# Patient Record
Sex: FEMALE | Race: WHITE | NOT HISPANIC OR LATINO | URBAN - METROPOLITAN AREA
[De-identification: names, ages, dates, MRNs, and addresses within clinical notes are randomized per-mention and may not be internally consistent; named-entity substitution may affect disease eponyms.]

---

## 2023-06-14 ENCOUNTER — TELEPHONE (OUTPATIENT)
Dept: TRANSPLANT | Facility: CLINIC | Age: 53
End: 2023-06-14
Payer: COMMERCIAL

## 2023-06-14 NOTE — TELEPHONE ENCOUNTER
Patient was referred to us to see Dr Yan from Dr. Singh in [place] because of her post TOTAL PANCREATECTOMY-ISLET AUTO TRANSPLANT hypoglycemia she is having and she is not on insulin. Sent message to provider for scheduling options.

## 2023-07-23 ENCOUNTER — HEALTH MAINTENANCE LETTER (OUTPATIENT)
Age: 53
End: 2023-07-23

## 2023-08-31 ENCOUNTER — VIRTUAL VISIT (OUTPATIENT)
Dept: TRANSPLANT | Facility: CLINIC | Age: 53
End: 2023-08-31
Attending: PEDIATRICS
Payer: COMMERCIAL

## 2023-08-31 VITALS
HEART RATE: 64 BPM | WEIGHT: 151.1 LBS | DIASTOLIC BLOOD PRESSURE: 73 MMHG | SYSTOLIC BLOOD PRESSURE: 112 MMHG | RESPIRATION RATE: 16 BRPM | OXYGEN SATURATION: 100 %

## 2023-08-31 DIAGNOSIS — E16.1 REACTIVE HYPOGLYCEMIA: Primary | ICD-10-CM

## 2023-08-31 DIAGNOSIS — K91.2 HYPOGLYCEMIA AFTER GI (GASTROINTESTINAL) SURGERY: ICD-10-CM

## 2023-08-31 PROCEDURE — 99213 OFFICE O/P EST LOW 20 MIN: CPT | Performed by: PEDIATRICS

## 2023-08-31 PROCEDURE — 99205 OFFICE O/P NEW HI 60 MIN: CPT | Performed by: PEDIATRICS

## 2023-08-31 RX ORDER — ACARBOSE 50 MG/1
TABLET ORAL
Qty: 180 TABLET | Refills: 11 | Status: SHIPPED | OUTPATIENT
Start: 2023-08-31

## 2023-08-31 RX ORDER — ESTRADIOL 0.05 MG/D
1 PATCH, EXTENDED RELEASE TRANSDERMAL
COMMUNITY
Start: 2023-08-26

## 2023-08-31 RX ORDER — ONDANSETRON 4 MG/1
4 TABLET, ORALLY DISINTEGRATING ORAL EVERY 6 HOURS PRN
COMMUNITY
Start: 2022-05-20

## 2023-08-31 RX ORDER — ALPRAZOLAM 0.25 MG
0.25 TABLET ORAL
COMMUNITY
Start: 2023-08-02

## 2023-08-31 NOTE — LETTER
8/31/2023         RE: Mallory Hagen  [address] [facility] [address]        Dear Colleague,    Thank you for referring your patient, Mallory Hagen, to the [facility]. Please see a copy of my visit note below.    Pediatric Endocrinology/ Islet Autotransplant  Chronic Pancreatitis/TPIAT Consult    Patient Active Problem List   Diagnosis     Reactive hypoglycemia     Hypoglycemia after GI (gastrointestinal) surgery       Assessment/Plan:  1.  Hypoglycemia, reactive hypoglycemia pattern after GI surgery  2.  S/p TPIAT in 2014    Mallory is a 52 year old with pancreatitis, who underwent a successful TPIAT procedure at the [facility] in 2014.  She is referred by her team at the [facility] to see me because she has started to have recurrent reactive hypoglycemia that is particularly impairing her quality of life.  She is exhausted which she feels is related to the glycemic excursions and struggles because while she can maintain a more stable glycemic pattern with diet, the diet that works for hypoglycemia is not at all tolerated by her GI system, and her GI diet is the opposite of what we recommend for hypoglycemia (GI diet is high in simple carbs, low protein/fat content).    In this context, we discussed trying acarbose at  mg per meal as the next step, which might help reduce the glycemic excursions she has with the GI diet.  We also agree that she should remain on her Dexcom G7 for monitoring, and we sent a Rx for a glucagon emergency pen (Gvoke) to have on hand in case of severe hypoglycemia, which fortunately she has not had any such episodes to date.    I am happy to continue to see her once a year and remain in virtual contact in between visits.    Patient Instructions   1)  Let's try the Acarbose 50 mg tablets, start with taking 1 with your main meals and see how that goes.  Watch for bloating, diarrhea, greasy stool as a sign of GI side effects.  If  you are tolerating it but still having lows, you can go up to 100 mg.    2)  Gvoke 1 mg emergency pen.  Have this on hand just in case of a low that you pass out or have a seizure (usually these are <40 mg/dL).    3)  We can be in touch remotely and I am happy to see you again next summer.       Phoebe Yan MD  [facility] [place]  Phone:  [phone]  Fax:  [phone]          CC:  Chronic pancreatitis, surgical evaluation    HPI:  Mallory Hagen is a 52 year old female referred by Dr. Mich Singh for new patient consultation of endocrine function in the setting of chronic pancreatitis.    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  She had a successful TPIAT, with reduction in pain and insulin independence about 6 weeks post op, with HbA1c levels in the 5.4- 5.7% range. However, the concern now is hypoglycemia.  She was unaware that this was a problem, but in retrospect thinks it may have been going on for up to 2 years, based on the symptoms she has with lows (mainly nausea).  She was having particular symptoms earlier this year including nausea, sweating, poor sleep, anxiety and did not initially connect it to hypoglycemia but had the idea to ask her local endo for a CGM.  WHen she started CGM she saw that she was having hypoglycemia, at lowest per our prior conversations was 39 mg/dL.   She does get nausea around lows.  She also gets very tired and goes to bed routinely around 7pm.  She is on a plant based diet, primarily, with occasional dairy and rarely meat.  She also has GI symptoms that do best if she has a diet of simple carbs, low protein and fat.  Unfortunately that diet triggers her reactive hypoglycemia.  If she changes to a  low carb, high protein and fat diet, she does better with glucose control but feels unwell with GI symptoms so she is struggling to balance this.  She has not been on any medication treatment, she is on the dexcom which we reviewed below.  Hypoglycemia appears to be only reactive post-meal (hyper and hypo pattern that is classic for this).  She does not have exercise induced hypoglycemia but she also is not exercising as much now however.  She does an intermittent fasting diet and when not eating her glucose pattern is very stable.                         OP NOTE reviewed and copied below  dmission Date: 01/09/2014  Date of Surgery: 01/09/2014    Age: 43 Sex: F    Attending Surgeon: Mich Singh MD, PhD  Resident Surgeon:    STATEMENT OF ATTENDING SURGEON'S PRESENCE:  Dr. Jay Silva MD, performed main parts of the operation total  pancreatectomy with islet auto transplantationand. I as attending surgeon  performed backtable preparation of the pancreas and islet isolation and assist  during during islet autotransplantation.    ASSISTANTS:  Dr. Fletcher    PREOPERATIVE DIAGNOSIS:  Chronic pancreatitis with intractable pain.    POSTOPERATIVE DIAGNOSIS:  Chronic pancreatitis with intractable pain.    OPERATION PERFORMED:  Total pancreatectomy with islet transplantation.    ANESTHESIA:  General orotracheal.    COMPLICATIONS:    ESTIMATED BLOOD LOSS:  2ml during islet transplantation    SPECIMENS REMOVED:    INDICATION FOR PROCEDURE:  Chronic pancreatitis, intractable pain. Islet transplantation part was  performed in order to prevent diabetes or improve glucose control after the  operation.    OPERATIVE FINDINGS:  I joined the operarion when the pncreas was about to be resected. The pancreas  was found to be changed by chronic inflammatory process. Pancreas in one  piece with duodenum and spleen was resected and then transected and cannulated  ion the back table in the OR and then taken to the isolation room.  We were  able to isolate 330,000 islet equivalent and 5 mL of tissue after  purification. We obtained 30 mL of tissue prior to that.. Islets were infused  uneventfully into the portal vein at the end of the procedure.    OPERATIVE PROCEDURE:  The main part of the operation was dictated by Dr. Silva who also performed  excision of the pancreas together with the spleen and duodenum. The specimen  was immediately passed to the back table where I performed perfusion with the  SPS preservation solution via cannulation of the splenic artery after opening  the splenic vein. The preservation solution immediately became clear. The  pancreas was washed out from the blood and trimmed from excessive tissue. The  duodenum was dissected out as well as the spleen, and then the pancreas was  transected from the fat between the head and the neck, and the pancreatic duct  was found to be around 2 mm diameter. It was cannulated with a 14-gauge  Angiocath in both directions, and the Angiocath was secured with silk 2-0 tie.  After the pancreas was washed out from the blood, then it was placed in a  Nalgene jar and then put in another container and transported to the isolation  facility. During the isolation, we obtained 30 mL of tissue which was then  purified and we obtained 330 islet equivalents and 5 mL of pellet which was  suspended in the transplantation solution with 4500 units of heparin and some  albumin . It was then brought to the operating room for the infusion. Next,,  the portal vein was cannulated and islets were infused. Portal pressure  remained within normal limits, and the texture of the liver did not change  during the procedure. The patient remained stable. Of note, Gram stain was  positive and the endotoxin test was negative. Viability was around 98%.    Electronically Signed by Mich Singh MD, PhD on 02/01/2014 at 19:27:58  ___________________________________  Mich Singh MD, PhD    of Surgery  Director, Islet Cell Transplantation    DD: 01/17/2014 06:51:26 CST/24/UC70  DT: 01/17/2014 09:29:38 [facility]/[id number]  JOB#: [id number]    Electronically signed by Mich Singh M.D. at 02/01/2014 7:27 PM CST  Operative Report - Jay Silva M.D. - 01/10/2014 7:03 AM CST  Formatting of this note might be different from the original.  Admission Date: 01/09/2014  Date of Surgery: 01/09/2014      Review of Systems:  A comprehensive 10 point review of systems was performed and was negative except as noted in the HPI above.    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  Ms. Hagen is s/p total pancreatectomy with isle autocell transplantation in 1/9/2014   Chronic pancreatitis 01/31/2014     S/P splenectomy 01/31/2014     Recurrent acute pancreatitis 04/26/2013       Surgical History    Surgical History  Surgery Date Site/Laterality Comments   CHOLECYSTECTOMY;         HX SPINAL FUSION 5062-3727   C4-C6    HX SALPINGO-OOPHORECTOMY     unilateral    TONSILLECTOMY & ADENOIDECTOMY; < AGE 12         TOTAL ABDOMINAL HYSTERECTOMY W/WO REMOVAL TUBE(S)/OVARY(S);         ARTHROSCOPY, KNEE, DX, W/WO SYNOVIAL BX (SEP PROC)         EXPLORATORY LAPAROTOMY, EXPLORATORY CELIOTOMY W/WO BX(S) (SEP PROC)         EGD         ------------OTHER------------- 01/09/2014   total pancreatectomy with auto islet cell transplantation     PANCREATECTOMY, TOTAL           Medical History    Medical History  Medical History Date Comments   Fibromyalgia       Hyperlipemia       Cervical disc disease       Acute pancreatitis       Pancreas divisum       Vitamin D deficiency       Osteoarthritis       Anemia        DVT (deep venous thrombosis) (CMS/HCC)   related to PICC line   Premature atrial contractions             Current medications:  Current Outpatient Medications   Medication Sig Dispense Refill     acarbose (PRECOSE) 50 MG tablet 50 mg to 100 mg up to three times daily with meals. 180 tablet 11     ALPRAZolam (XANAX) 0.25 MG tablet Take 0.25 mg by mouth nightly as needed       estradiol (VIVELLE-DOT) 0.05 MG/24HR bi-weekly patch Place 1 patch onto the skin twice a week       Glucagon (GVOKE HYPOPEN) 1 MG/0.2ML pen Inject the contents of 1 device under the skin into lower abdomen, outer thigh, or outer upper arm as needed for hypoglycemia. If no response after 15 minutes, additional 1 mg dose from a new device may be injected while waiting for emergency assistance. 0.4 mL 0     ondansetron (ZOFRAN ODT) 4 MG ODT tab Take 4 mg by mouth every 6 hours as needed       lipase-protease-amylase (ZENPEP) 31944-89803-63878 units CPEP Take 125,000 Units by mouth 3 times daily (with meals)         Family History:  Family History    Family History  Medical History Relation Comments   Coronary Artery Disease Father     Heart Disease Mother     Stroke Mother     Clotting Disorder Neg Hx     Coagulopathy Neg Hx     Malignant Hyperthermia Neg Hx             Social History:  Lives in [place].  Here with her .      Physical Exam:   Wt 66.2 kg, hg 160cm, BMI 25.8  Vitals: /73 (BP Location: Right arm, Patient Position: Sitting, Cuff Size: Adult Regular)   Pulse 64   Resp 16   Wt 68.5 kg (151 lb 1.6 oz)   SpO2 100%   BMI= There is no height or weight on file to calculate BMI.General:  Appearance is normal, no acute distress  HEENT:  NC/AT, sclera appear white  Neck:  No obvious thyromegaly  CV/Lungs:  Non distressed breathing  Skin:  No apparent rashes  Neuro:  Normal mental status  Psych:  Normal affect    Results:  Reviewed labs in CareEverywhere from June 5 2023      Again, thank you for allowing me to participate in  the care of your patient.        Sincerely,        Pheobe Yan MD

## 2023-08-31 NOTE — NURSING NOTE
Chief Complaint   Patient presents with    Consult For     Initial consult with Dr. Bryson     /73 (BP Location: Right arm, Patient Position: Sitting, Cuff Size: Adult Regular)   Pulse 64   Resp 16   Wt 68.5 kg (151 lb 1.6 oz)   SpO2 100%     BREANNE BERGER, RN on 8/31/2023 at 12:13 PM

## 2023-08-31 NOTE — LETTER
"    8/31/2023         RE: Mallory Hagen  [address] [facility] [address]        Dear Colleague,    Thank you for referring your patient, Mallory Hagen, to the [facility]. Please see a copy of my visit note below.    Pt evaluated via billable telephone visit. Time spent: 15 min  Provider location: offsite     [facility]  Outpatient MNT: Post Transplant    Time Spent: 15 minutes  Visit Type: Initial   Referring Physician: Yuriy   Pt accompanied by: self     Txp hx: TP AIT 1/2014    Nutrition Assessment/Diet Recall  Pt is experiencing reactive hypoglycemia. She reports recently getting a CGM in May and noticing that her BG swing rapidly during the day. She has had nausea as a sx and is now noticing nausea is correlated w/ BG swings.   She met with Dr Yan today and she reportedly prescribed a medication to take to help with BG swings.     Pt has been vegan x 12 years. She added some dairy in 2-3 years ago.   She reports trying to eat generally low carb, but some of the foods she can eat cause GI issues, etc.     She also practices intermittent fasting and reports better numbers than if she eats in the AM.   L- salad w/ tofu, nuts; keto bread w/ PB  D- stir rahman with veggies, tofu or seitan (0-1/4 cup rice); some high protein pasta    She reports ice cream and pizza \"keep BG stable\", likely due to high fat content. She reports unable to tolerate other high fat food, such as avocado, etc.     Nutrition Diagnosis  No nutrition diagnosis identified at this time     Nutrition Intervention  Reviewed some common recommendations that may help w/ reactive hypoglycemia, yet pt reports minimal success with them (reviewed eating at regular intervals, including a small amount of carb + pro + fat as tolerated).     Patient Understanding: Pt verbalized understanding of education provided.  Expected Engagement: Good  Follow-Up Plans: PRN     Nutrition Goals  No " nutrition goals identified at this time     Cheli Walton RD, LD, CCTD                                    Again, thank you for allowing me to participate in the care of your patient.        Sincerely,        Cheli Walton RD

## 2023-08-31 NOTE — PROGRESS NOTES
"Pt evaluated via billable telephone visit. Time spent: 15 min  Provider location: [facility]plant  Outpatient MNT: Post Transplant    Time Spent: 15 minutes  Visit Type: Initial   Referring Physician: Yuriy   Pt accompanied by: self     Txp hx: TP AIT 1/2014    Nutrition Assessment/Diet Recall  Pt is experiencing reactive hypoglycemia. She reports recently getting a CGM in May and noticing that her BG swing rapidly during the day. She has had nausea as a sx and is now noticing nausea is correlated w/ BG swings.   She met with Dr Yan today and she reportedly prescribed a medication to take to help with BG swings.     Pt has been vegan x 12 years. She added some dairy in 2-3 years ago.   She reports trying to eat generally low carb, but some of the foods she can eat cause GI issues, etc.     She also practices intermittent fasting and reports better numbers than if she eats in the AM.   L- salad w/ tofu, nuts; keto bread w/ PB  D- stir rahman with veggies, tofu or seitan (0-1/4 cup rice); some high protein pasta    She reports ice cream and pizza \"keep BG stable\", likely due to high fat content. She reports unable to tolerate other high fat food, such as avocado, etc.     Nutrition Diagnosis  No nutrition diagnosis identified at this time     Nutrition Intervention  Reviewed some common recommendations that may help w/ reactive hypoglycemia, yet pt reports minimal success with them (reviewed eating at regular intervals, including a small amount of carb + pro + fat as tolerated).     Patient Understanding: Pt verbalized understanding of education provided.  Expected Engagement: Good  Follow-Up Plans: PRN     Nutrition Goals  No nutrition goals identified at this time     Cheli Walton, RD, LD, CCTD                                  "

## 2023-08-31 NOTE — PATIENT INSTRUCTIONS
1)  Let's try the Acarbose 50 mg tablets, start with taking 1 with your main meals and see how that goes.  Watch for bloating, diarrhea, greasy stool as a sign of GI side effects.  If you are tolerating it but still having lows, you can go up to 100 mg.    2)  Gvoke 1 mg emergency pen.  Have this on hand just in case of a low that you pass out or have a seizure (usually these are <40 mg/dL).    3)  We can be in touch remotely and I am happy to see you again next summer.

## 2023-08-31 NOTE — PROGRESS NOTES
Pediatric Endocrinology/ Islet Autotransplant  Chronic Pancreatitis/TPIAT Consult    Patient Active Problem List   Diagnosis    Reactive hypoglycemia    Hypoglycemia after GI (gastrointestinal) surgery       Assessment/Plan:  1.  Hypoglycemia, reactive hypoglycemia pattern after GI surgery  2.  S/p TPIAT in 2014    Mallory is a 52 year old with pancreatitis, who underwent a successful TPIAT procedure at the [facility] in 2014.  She is referred by her team at the [facility] to see me because she has started to have recurrent reactive hypoglycemia that is particularly impairing her quality of life.  She is exhausted which she feels is related to the glycemic excursions and struggles because while she can maintain a more stable glycemic pattern with diet, the diet that works for hypoglycemia is not at all tolerated by her GI system, and her GI diet is the opposite of what we recommend for hypoglycemia (GI diet is high in simple carbs, low protein/fat content).    In this context, we discussed trying acarbose at  mg per meal as the next step, which might help reduce the glycemic excursions she has with the GI diet.  We also agree that she should remain on her Dexcom G7 for monitoring, and we sent a Rx for a glucagon emergency pen (Gvoke) to have on hand in case of severe hypoglycemia, which fortunately she has not had any such episodes to date.    I am happy to continue to see her once a year and remain in virtual contact in between visits.    Patient Instructions   1)  Let's try the Acarbose 50 mg tablets, start with taking 1 with your main meals and see how that goes.  Watch for bloating, diarrhea, greasy stool as a sign of GI side effects.  If you are tolerating it but still having lows, you can go up to 100 mg.    2)  Gvoke 1 mg emergency pen.  Have this on hand just in case of a low that you pass out or have a seizure (usually these are <40 mg/dL).    3)  We can be in touch remotely and I  am happy to see you again next summer.       Phoebe Yan MD  [facility] [place]  Phone:  [phone]  Fax:  [phone]          CC:  Chronic pancreatitis, surgical evaluation    HPI:  Mallory Hagen is a 52 year old female referred by Dr. Mich Singh for new patient consultation of endocrine function in the setting of chronic pancreatitis.    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  She had a successful TPIAT, with reduction in pain and insulin independence about 6 weeks post op, with HbA1c levels in the 5.4- 5.7% range. However, the concern now is hypoglycemia.  She was unaware that this was a problem, but in retrospect thinks it may have been going on for up to 2 years, based on the symptoms she has with lows (mainly nausea).  She was having particular symptoms earlier this year including nausea, sweating, poor sleep, anxiety and did not initially connect it to hypoglycemia but had the idea to ask her local endo for a CGM.  WHen she started CGM she saw that she was having hypoglycemia, at lowest per our prior conversations was 39 mg/dL.   She does get nausea around lows.  She also gets very tired and goes to bed routinely around 7pm.  She is on a plant based diet, primarily, with occasional dairy and rarely meat.  She also has GI symptoms that do best if she has a diet of simple carbs, low protein and fat.  Unfortunately that diet triggers her reactive hypoglycemia.  If she changes to a low carb, high protein and fat diet, she does better with glucose control but feels unwell with GI symptoms so she is struggling to balance this.  She has not been on any medication treatment, she is on the dexcom which we reviewed below.   Hypoglycemia appears to be only reactive post-meal (hyper and hypo pattern that is classic for this).  She does not have exercise induced hypoglycemia but she also is not exercising as much now however.  She does an intermittent fasting diet and when not eating her glucose pattern is very stable.                         OP NOTE reviewed and copied below  dmission Date: 01/09/2014  Date of Surgery: 01/09/2014    Age: 43 Sex: F    Attending Surgeon: Mich Singh MD, PhD  Resident Surgeon:    STATEMENT OF ATTENDING SURGEON'S PRESENCE:  Dr. Jay Silva MD, performed main parts of the operation total  pancreatectomy with islet auto transplantationand. I as attending surgeon  performed backtable preparation of the pancreas and islet isolation and assist  during during islet autotransplantation.    ASSISTANTS:  Dr. Fletcher    PREOPERATIVE DIAGNOSIS:  Chronic pancreatitis with intractable pain.    POSTOPERATIVE DIAGNOSIS:  Chronic pancreatitis with intractable pain.    OPERATION PERFORMED:  Total pancreatectomy with islet transplantation.    ANESTHESIA:  General orotracheal.    COMPLICATIONS:    ESTIMATED BLOOD LOSS:  2ml during islet transplantation    SPECIMENS REMOVED:    INDICATION FOR PROCEDURE:  Chronic pancreatitis, intractable pain. Islet transplantation part was  performed in order to prevent diabetes or improve glucose control after the  operation.    OPERATIVE FINDINGS:  I joined the operarion when the pncreas was about to be resected. The pancreas  was found to be changed by chronic inflammatory process. Pancreas in one  piece with duodenum and spleen was resected and then transected and cannulated  ion the back table in the OR and then taken to the isolation room. We were  able to isolate 330,000 islet equivalent and 5 mL of tissue after  purification. We obtained 30 mL of tissue prior to that.. Islets were infused  uneventfully into the portal vein at the end of the procedure.    OPERATIVE  PROCEDURE:  The main part of the operation was dictated by Dr. Silva who also performed  excision of the pancreas together with the spleen and duodenum. The specimen  was immediately passed to the back table where I performed perfusion with the  SPS preservation solution via cannulation of the splenic artery after opening  the splenic vein. The preservation solution immediately became clear. The  pancreas was washed out from the blood and trimmed from excessive tissue. The  duodenum was dissected out as well as the spleen, and then the pancreas was  transected from the fat between the head and the neck, and the pancreatic duct  was found to be around 2 mm diameter. It was cannulated with a 14-gauge  Angiocath in both directions, and the Angiocath was secured with silk 2-0 tie.  After the pancreas was washed out from the blood, then it was placed in a  Nalgene jar and then put in another container and transported to the isolation  facility. During the isolation, we obtained 30 mL of tissue which was then  purified and we obtained 330 islet equivalents and 5 mL of pellet which was  suspended in the transplantation solution with 4500 units of heparin and some  albumin . It was then brought to the operating room for the infusion. Next,,  the portal vein was cannulated and islets were infused. Portal pressure  remained within normal limits, and the texture of the liver did not change  during the procedure. The patient remained stable. Of note, Gram stain was  positive and the endotoxin test was negative. Viability was around 98%.    Electronically Signed by Mich Singh MD, PhD on 02/01/2014 at 19:27:58  ___________________________________  Mich Singh MD, PhD  Associate Professor of Surgery  Director, Islet Cell Transplantation    DD: 01/17/2014 06:51:26 [id number]  DT: 01/17/2014 09:29:38 [id number]  JOB#: [id number]    Electronically signed by Mich Singh M.D. at 02/01/2014 7:27 PM  CST  Operative Report - Jay Silva M.D. - 01/10/2014 7:03 AM CST  Formatting of this note might be different from the original.  Admission Date: 01/09/2014  Date of Surgery: 01/09/2014      Review of Systems:  A comprehensive 10 point review of systems was performed and was negative except as noted in the HPI above.    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  Ms. Hagen is s/p total pancreatectomy with isle autocell transplantation in 1/9/2014   Chronic pancreatitis 01/31/2014     S/P splenectomy 01/31/2014     Recurrent acute pancreatitis 04/26/2013       Surgical History    Surgical History  Surgery Date Site/Laterality Comments   CHOLECYSTECTOMY;         HX SPINAL FUSION [id number]   C4-C6    HX SALPINGO-OOPHORECTOMY     unilateral    TONSILLECTOMY & ADENOIDECTOMY; < AGE 12         TOTAL ABDOMINAL HYSTERECTOMY W/WO REMOVAL TUBE(S)/OVARY(S);         ARTHROSCOPY, KNEE, DX, W/WO SYNOVIAL BX (SEP PROC)         EXPLORATORY LAPAROTOMY, EXPLORATORY CELIOTOMY W/WO BX(S) (SEP PROC)         EGD         ------------OTHER------------- 01/09/2014   total pancreatectomy with auto islet cell transplantation     PANCREATECTOMY, TOTAL           Medical History    Medical History  Medical History Date Comments   Fibromyalgia       Hyperlipemia       Cervical disc disease       Acute pancreatitis       Pancreas divisum       Vitamin D deficiency       Osteoarthritis       Anemia       DVT (deep venous thrombosis) (CMS/HCC)   related to PICC line   Premature atrial contractions             Current medications:  Current Outpatient Medications   Medication Sig Dispense Refill    acarbose (PRECOSE) 50  MG tablet 50 mg to 100 mg up to three times daily with meals. 180 tablet 11    ALPRAZolam (XANAX) 0.25 MG tablet Take 0.25 mg by mouth nightly as needed      estradiol (VIVELLE-DOT) 0.05 MG/24HR bi-weekly patch Place 1 patch onto the skin twice a week      Glucagon (GVOKE HYPOPEN) 1 MG/0.2ML pen Inject the contents of 1 device under the skin into lower abdomen, outer thigh, or outer upper arm as needed for hypoglycemia. If no response after 15 minutes, additional 1 mg dose from a new device may be injected while waiting for emergency assistance. 0.4 mL 0    ondansetron (ZOFRAN ODT) 4 MG ODT tab Take 4 mg by mouth every 6 hours as needed      lipase-protease-amylase (ZENPEP) 31613-39[address]-08234 units CPEP Take 125,000 Units by mouth 3 times daily (with meals)         Family History:  Family History    Family History  Medical History Relation Comments   Coronary Artery Disease Father     Heart Disease Mother     Stroke Mother     Clotting Disorder Neg Hx     Coagulopathy Neg Hx     Malignant Hyperthermia Neg Hx             Social History:  Lives in [place].  Here with her .      Physical Exam:   Wt 66.2 kg, hg 160cm, BMI 25.8  Vitals: /73 (BP Location: Right arm, Patient Position: Sitting, Cuff Size: Adult Regular)   Pulse 64   Resp 16   Wt 68.5 kg (151 lb 1.6 oz)   SpO2 100%   BMI= There is no height or weight on file to calculate BMI.General:  Appearance is normal, no acute distress  HEENT:  NC/AT, sclera appear white  Neck:  No obvious thyromegaly  CV/Lungs:  Non distressed breathing  Skin:  No apparent rashes  Neuro:  Normal mental status  Psych:  Normal affect    Results:  Reviewed labs in CareEverywhere from June 5 2023

## 2023-09-03 PROBLEM — E16.1 REACTIVE HYPOGLYCEMIA: Status: ACTIVE | Noted: 2023-09-03

## 2023-09-03 PROBLEM — K91.2 HYPOGLYCEMIA AFTER GI (GASTROINTESTINAL) SURGERY: Status: ACTIVE | Noted: 2023-09-03

## 2023-11-06 ENCOUNTER — TELEPHONE (OUTPATIENT)
Dept: TRANSPLANT | Facility: CLINIC | Age: 53
End: 2023-11-06
Payer: COMMERCIAL

## 2023-11-06 NOTE — TELEPHONE ENCOUNTER
Pt would like to contact Dr Yuriy Vidal follows her  She is having problems with MyChart today  Call her before 1030 or after 12   has Dr Christine today

## 2023-11-08 NOTE — TELEPHONE ENCOUNTER
"Returned call to Mallory for updates.     Mallory had called to update Dr. Yan  after being the acarbose for a few months.     Reports that it's really helping with her hypoglycemia (down to 1 hypo event per week vs 4-5 per night) and has helped with hyperglycemia too--she's rarely if ever above 200 mg/dL now.     Unfortunately she's had a major increase in gassiness that's persistent and painful despite maximum use of Simethicone every day. She \"feels crummy\" overall because of this and is wondering if there's something else she should/could use to manage this instead. Told coordinator that \"her  and anyone who has to spend time with her\" would appreciate a stronger medication to treat the gas/bloating.     Update to Dr Yan; she or primary care coordinator will reach out to patient with follow up plan.     Shawanda Santacruz RN Transplant Coordinator on November 8, 2023 2:52 PM       "

## 2024-01-22 NOTE — TELEPHONE ENCOUNTER
Patient called to report spiking high blood sugars and dropping low sugars with in 45 min and is wanting to know what to do? Caller has stated she has been feeling very well and would like a call back.

## 2024-01-24 NOTE — TELEPHONE ENCOUNTER
Gave Mallory a call to see how she has been doing and she has not been good.  Many issues that she is getting looked at close to home but would like follow up with Dr Yan to see if she has any more advise.  Scheduled phone visit for Feb 1st.

## 2024-02-01 ENCOUNTER — VIRTUAL VISIT (OUTPATIENT)
Dept: TRANSPLANT | Facility: CLINIC | Age: 54
End: 2024-02-01
Attending: PEDIATRICS
Payer: COMMERCIAL

## 2024-02-01 DIAGNOSIS — K91.2 HYPOGLYCEMIA AFTER GI (GASTROINTESTINAL) SURGERY: Primary | ICD-10-CM

## 2024-02-01 PROCEDURE — 99214 OFFICE O/P EST MOD 30 MIN: CPT | Mod: 95 | Performed by: PEDIATRICS

## 2024-02-01 RX ORDER — DIAZOXIDE 50 MG/ML
125 SUSPENSION ORAL DAILY
Qty: 90 ML | Refills: 11 | Status: SHIPPED | OUTPATIENT
Start: 2024-02-01

## 2024-02-01 NOTE — PROGRESS NOTES
Video-Visit Details    Type of service:  Video Visit   Joined the call at 2/1/2024, 12:54:24 pm.  Left the call at 2/1/2024, 1:13:16 pm.  You were on the call for 18 minutes 52 seconds .    Originating Location (pt. Location): Home  Distant Location (provider location):  On-site  Platform used for Video Visit: Michi  Signed Electronically by: Phoebe Yan MD      Pediatric Endocrinology/ Islet Autotransplant  Chronic Pancreatitis/TPIAT Consult    Patient Active Problem List   Diagnosis    Reactive hypoglycemia    Hypoglycemia after GI (gastrointestinal) surgery       Assessment/Plan:  1.  Hypoglycemia, reactive hypoglycemia pattern after GI surgery  2.  S/p TPIAT in 2014  ( status post total pancreatectomy, duodenectomy, splenectomy with gastrojejunostomy, hepaticojejunostomy, and islet cell transplant )  3.  Low vitamin A, low vitamin D.     Mallory is a 52 year old with pancreatitis, who underwent a successful TPIAT procedure at the [facility] in 2014.  She is referred by her team at the [facility] to see me because she has started to have recurrent reactive hypoglycemia that is particularly impairing her quality of life.  She is exhausted which she feels is related to the glycemic excursions and struggles because while she can maintain a more stable glycemic pattern with diet, the diet that works for hypoglycemia is not at all tolerated by her GI system, and her GI diet is the opposite of what we recommend for hypoglycemia (GI diet is high in simple carbs, low protein/fat content).    At initial consult on 8/31/2023 we recommended:  Continue G7 monitoring, try adding acarbose ( mg TID with meals), continue on current GI diet, and Rx sent for Gvoke to have on hand in case of severe hypoglycemia.    Unfortunately she is still having episodes of hypoglycemia.  These are unpredictable except that they do not occur while fasting, not a lot of time in hypoglycemia but the episodes can  be substantial, with drops down to 40s mg/dL.  She does not tolerate acarbose above 50 mg and even this causes her some GI side effects.  For these reasons, we decided to try diazoxide at a low dose, and stop acarbose.  We did talk about potential side effects of diazoxide including fluid retention and hypertrichosis.  She will update me on her progress after starting diazoxide.       Patient Instructions   1)  Start diazoxide 125 mg (2.5 mL) once daily.      2)  Once you start on the diazoxide, you can stop the acarbose.    3)  I'd suggest keeping your diet the same as we make these changes.  For your type of hypoglycemia I do think that intermittent fasting is OK (food is what tends to trigger your islets and cause glucose instability).    4)  Let's touch base in about a month to see how this new regimen is working.       Phoebe Yan MD  [facility] [place]  Phone:  [phone]  Fax:  [phone]    Review of the result(s) of each unique test - as noted  Prescription drug management  30 minutes spent by me on the date of the encounter doing chart review, history and exam, documentation and further activities per the note        CC:  Chronic pancreatitis, surgical evaluation    HPI:  Mallory Hagen is a 52 year old female referred by Dr. Mich Singh, being seen today for follow up of hypoglycemia and endocrine function in the setting of TPIAT for chronic pancreatitis.  Her GI anastomosis is a gastrojejunostomy.     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  She had a successful TPIAT, with reduction in pain and insulin independence about 6 weeks post op, with HbA1c levels  in the 5.4- 5.7% range. However, the concern now is hypoglycemia.  She was unaware that this was a problem, but in retrospect thinks it may have been going on for up to 2 years, based on the symptoms she has with lows (mainly nausea).  She was having particular symptoms earlier this year including nausea, sweating, poor sleep, anxiety and did not initially connect it to hypoglycemia but had the idea to ask her local endo for a CGM.  WHen she started CGM she saw that she was having hypoglycemia, at lowest per our prior conversations was 39 mg/dL.   She does get nausea around lows.  She also gets very tired and goes to bed routinely around 7pm.  She is on a plant based diet, primarily, with occasional dairy and rarely meat.  She also has GI symptoms that do best if she has a diet of simple carbs, low protein and fat.  Unfortunately that diet triggers her reactive hypoglycemia.  If she changes to a low carb, high protein and fat diet, she does better with glucose control but feels unwell with GI symptoms so she is struggling to balance this.  She has not been on any medication treatment, she is on the dexcom which we reviewed below.  Hypoglycemia appears to be only reactive post-meal (hyper and hypo pattern that is classic for this).  She does not have exercise induced hypoglycemia but she also is not exercising as much now however.  She does an intermittent fasting diet and when not eating her glucose pattern is very stable.     OP NOTE reviewed  OPERATIVE FINDINGS:  I joined the operarion when the pncreas was about to be resected. The pancreas  was found to be changed by chronic inflammatory process. Pancreas in one  piece with duodenum and spleen was resected and then transected and cannulated  ion the back table in the OR and then taken to the isolation room. We were  able to isolate 330,000 islet equivalent and 5 mL of tissue after  purification. We obtained 30 mL of tissue prior to that.. Islets were  infused  uneventfully into the portal vein at the end of the procedure.    Recommendations after initial consult were: continue G7, start acarbose, continue GI diet, and have glucagon on hand in case of emergency hypoglycemia        Interval history:  She is still struggling with hypoglycemia.  These wake her up at night sometimes, though for the most part to me they do appear to be food related. She doesn't eat breakfast and so will do stretches of fasting and really does start to stabilize during those times.  Her routine now is to start eating around lunch, and has dinner and a bedtime snack about 7:30 and goes to bed early. She takes acarbose at 50 mg but has problems with GI symptoms and so does not tolerate going up to 100 mg.    She does have some symptoms when she is down to 40s.    Rarely has highs. A few spikes but very brief.     Other history includes recent struggles with fatigue, mono diagnosis based on EBV studies.   This does not seem like BG are very different with this vs before.   Think also having fatigue b/c alarms are waking her up after bedtime.     She had an A1c level done locally on 12/29/23 which was 5.8%.       Dexcom reviewed which shows some intermittent lows/highs, as seen in daily pattern detail,.   Of note, her low episodes are as low as 45 mg/dL in the past 2 weeks.              Review of Systems:  A comprehensive 10 point review of systems was performed and was negative except as noted in the HPI above.    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  Ms. Hagen is s/p total pancreatectomy with isle autocell transplantation in 1/9/2014   Chronic pancreatitis  01/31/2014     S/P splenectomy 01/31/2014     Recurrent acute pancreatitis 04/26/2013       Surgical History    Surgical History  Surgery Date Site/Laterality Comments   CHOLECYSTECTOMY;         HX SPINAL FUSION 3989-3606   C4-C6    HX SALPINGO-OOPHORECTOMY     unilateral    TONSILLECTOMY & ADENOIDECTOMY; < AGE 12         TOTAL ABDOMINAL HYSTERECTOMY W/WO REMOVAL TUBE(S)/OVARY(S);         ARTHROSCOPY, KNEE, DX, W/WO SYNOVIAL BX (SEP PROC)         EXPLORATORY LAPAROTOMY, EXPLORATORY CELIOTOMY W/WO BX(S) (SEP PROC)         EGD         ------------OTHER------------- 01/09/2014   total pancreatectomy with auto islet cell transplantation     PANCREATECTOMY, TOTAL           Medical History    Medical History  Medical History Date Comments   Fibromyalgia       Hyperlipemia       Cervical disc disease       Acute pancreatitis       Pancreas divisum       Vitamin D deficiency       Osteoarthritis       Anemia       DVT (deep venous thrombosis) (CMS/HCC)   related to PICC line   Premature atrial contractions             Current medications:  Current Outpatient Medications   Medication Sig Dispense Refill    diazoxide (PROGLYCEM) 50 MG/ML suspension Take 2.5 mLs (125 mg) by mouth daily 90 mL 11    acarbose (PRECOSE) 50 MG tablet 50 mg to 100 mg up to three times daily with meals. 180 tablet 11    ALPRAZolam (XANAX) 0.25 MG tablet Take 0.25 mg by mouth nightly as needed      estradiol (VIVELLE-DOT) 0.05 MG/24HR bi-weekly patch Place 1 patch onto the skin twice a week      Glucagon (GVOKE HYPOPEN) 1 MG/0.2ML pen Inject the contents of 1 device under the skin into lower abdomen, outer thigh, or outer upper arm as needed for hypoglycemia. If no response after 15 minutes, additional 1 mg dose from a new device may be injected while waiting for emergency assistance. 0.4 mL 0    lipase-protease-amylase (ZENPEP) 06287-71230-19848 units CPEP Take 125,000 Units by mouth 3 times daily (with meals)      ondansetron (ZOFRAN ODT) 4 MG ODT  tab Take 4 mg by mouth every 6 hours as needed         Family History:  Family History    Family History  Medical History Relation Comments   Coronary Artery Disease Father     Heart Disease Mother     Stroke Mother     Clotting Disorder Neg Hx     Coagulopathy Neg Hx     Malignant Hyperthermia Neg Hx             Social History:  Lives in [place] with her .     Physical Exam:   Wt 66.2 kg, hg 160cm, BMI 25.8  Vitals: There were no vitals taken for this visit.  BMI= There is no height or weight on file to calculate BMI.General:  Appearance is normal, no acute distress  HEENT:  NC/AT, sclera appear white  Neck:  No obvious thyromegaly  CV/Lungs:  Non distressed breathing  Skin:  No apparent rashes  Neuro:  Normal mental status  Psych:  Normal affect    Results:  Reviewed labs from 12/29/23 in [facility].  Notable for low vit D, low vit A    VITAMIN A  Order: [id number]  Component  Ref Range & Units 1 mo ago   VITAMIN A  38 - 98 mcg/dL 28 Low    Comment: Vitamin supplementation within 24 hours prior to  blood draw may affect the accuracy of the results.  This test was developed and its analytical performance  characteristics have been determined by [employer] [place], [place]. It has  not been cleared or approved by the U.S. Food and Drug  Administration. This assay has been validated pursuant  to the CLIA regulations and is used for clinical  purposes.  Test Performed by [facility],  [employer] [place],  [address] [place] [id number]  Steve Tan M.D., Ph.D., Director of Laboratories  [phone], CLIA [id number]  PERFORMED BY:[employer],  [address] [place] [address],  [address] [address], [place]  [address]   Resulting Agency [facility]     Specimen Collected: 12/29/23 10:43 AM    Performed by: [facility] Last Resulted: 01/04/24  3:08 AM   Received From: [facility]  Result Received: 01/24/24 12:12 PM       ins abnormal data VITAMIN D, 25-HYDROXY  Order: [id number]  Component  Ref Range & Units 1 mo ago   VITAMIN D, 25-OH, TOTAL  30.0 - 100.0 ng/mL 13.1 Low    Comment: PERFORMED BY:[employer] [facility] [employer],  [address] [place] [address] [place]  [address] [phone]   Resulting Agency [facility]     Specimen Collected: 12/29/23 10:43 AM    Performed by: [facility] Last Resulted: 12/29/23  5:24 PM   Received From: [facility]  Result Received: 01/24/24 12:12 PM

## 2024-02-01 NOTE — LETTER
2/1/2024         RE: Mallory Hagen  [address] [facility] [address]        Dear Colleague,    Thank you for referring your patient, Mallory Hagen, to the [facility]. Please see a copy of my visit note below.        Video-Visit Details    Type of service:  Video Visit   Joined the call at 2/1/2024, 12:54:24 pm.  Left the call at 2/1/2024, 1:13:16 pm.  You were on the call for 18 minutes 52 seconds .    Originating Location (pt. Location): Home  Distant Location (provider location):  On-site  Platform used for Video Visit: Michi  Signed Electronically by: Phoebe Yan MD      Pediatric Endocrinology/ Islet Autotransplant  Chronic Pancreatitis/TPIAT Consult    Patient Active Problem List   Diagnosis     Reactive hypoglycemia     Hypoglycemia after GI (gastrointestinal) surgery       Assessment/Plan:  1.  Hypoglycemia, reactive hypoglycemia pattern after GI surgery  2.  S/p TPIAT in 2014  ( status post total pancreatectomy, duodenectomy, splenectomy with gastrojejunostomy, hepaticojejunostomy, and islet cell transplant )  3.  Low vitamin A, low vitamin D.     Mallory is a 52 year old with pancreatitis, who underwent a successful TPIAT procedure at the [facility] in 2014.  She is referred by her team at the [facility] to see me because she has started to have recurrent reactive hypoglycemia that is particularly impairing her quality of life.  She is exhausted which she feels is related to the glycemic excursions and struggles because while she can maintain a more stable glycemic pattern with diet, the diet that works for hypoglycemia is not at all tolerated by her GI system, and her GI diet is the opposite of what we recommend for hypoglycemia (GI diet is high in simple carbs, low protein/fat content).    At initial consult on 8/31/2023 we recommended:  Continue G7 monitoring, try adding acarbose ( mg TID with meals), continue on current GI diet, and Rx  sent for Gvoke to have on hand in case of severe hypoglycemia.    Unfortunately she is still having episodes of hypoglycemia.  These are unpredictable except that they do not occur while fasting, not a lot of time in hypoglycemia but the episodes can be substantial, with drops down to 40s mg/dL.  She does not tolerate acarbose above 50 mg and even this causes her some GI side effects.  For these reasons, we decided to try diazoxide at a low dose, and stop acarbose.  We did talk about potential side effects of diazoxide including fluid retention and hypertrichosis.  She will update me on her progress after starting diazoxide.       Patient Instructions   1)  Start diazoxide 125 mg (2.5 mL) once daily.      2)  Once you start on the diazoxide, you can stop the acarbose.    3)  I'd suggest keeping your diet the same as we make these changes.  For your type of hypoglycemia I do think that intermittent fasting is OK (food is what tends to trigger your islets and cause glucose instability).    4)  Let's touch base in about a month to see how this new regimen is working.       Phoebe Yan MD  [facility] [place]  Phone:  [phone]  Fax:  [phone]    Review of the result(s) of each unique test - as noted  Prescription drug management  30 minutes spent by me on the date of the encounter doing chart review, history and exam, documentation and further activities per the note        CC:  Chronic pancreatitis, surgical evaluation    HPI:  Mallory Hagen is a 52 year old female referred by Dr. Mich Singh, being seen today for follow up of hypoglycemia and endocrine function in the setting of TPIAT for chronic pancreatitis.  Her GI anastomosis is a gastrojejunostomy.     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  She had a successful TPIAT, with reduction in pain and insulin independence about 6 weeks post op, with HbA1c levels in the 5.4- 5.7% range. However, the concern now is hypoglycemia.  She was unaware that this was a problem, but in retrospect thinks it may have been going on for up to 2 years, based on the symptoms she has with lows (mainly nausea).  She was having particular symptoms earlier this year including nausea, sweating, poor sleep, anxiety and did not initially connect it to hypoglycemia but had the idea to ask her local endo for a CGM.  WHen she started CGM she saw that she was having hypoglycemia, at lowest per our prior conversations was 39 mg/dL.   She does get nausea around lows.  She also gets very tired and goes to bed routinely around 7pm.  She is on a plant based diet, primarily, with occasional dairy and rarely meat.  She also has GI symptoms that do best if she has a diet of simple carbs, low protein and fat.  Unfortunately that diet triggers her reactive hypoglycemia.  If she changes to a low carb, high protein and fat diet, she does better with glucose control but feels unwell with GI symptoms so she is struggling to balance this.  She has not been on any medication treatment, she is on the dexcom which we reviewed below.  Hypoglycemia appears to be only reactive post-meal (hyper and hypo pattern that is classic for this).  She does not have exercise induced hypoglycemia but she also is not exercising as much now however.  She does an intermittent fasting diet and when not eating her glucose pattern is very stable.     OP NOTE reviewed  OPERATIVE FINDINGS:  I joined the operarion when the pncreas was about to be resected. The pancreas  was found to be changed by chronic inflammatory process. Pancreas in one  piece with duodenum and spleen was resected and  then transected and cannulated  ion the back table in the OR and then taken to the isolation room. We were  able to isolate 330,000 islet equivalent and 5 mL of tissue after  purification. We obtained 30 mL of tissue prior to that.. Islets were infused  uneventfully into the portal vein at the end of the procedure.    Recommendations after initial consult were: continue G7, start acarbose, continue GI diet, and have glucagon on hand in case of emergency hypoglycemia        Interval history:  She is still struggling with hypoglycemia.  These wake her up at night sometimes, though for the most part to me they do appear to be food related. She doesn't eat breakfast and so will do stretches of fasting and really does start to stabilize during those times.  Her routine now is to start eating around lunch, and has dinner and a bedtime snack about 7:30 and goes to bed early. She takes acarbose at 50 mg but has problems with GI symptoms and so does not tolerate going up to 100 mg.    She does have some symptoms when she is down to 40s.    Rarely has highs. A few spikes but very brief.     Other history includes recent struggles with fatigue, mono diagnosis based on EBV studies.   This does not seem like BG are very different with this vs before.   Think also having fatigue b/c alarms are waking her up after bedtime.     She had an A1c level done locally on 12/29/23 which was 5.8%.       Dexcom reviewed which shows some intermittent lows/highs, as seen in daily pattern detail,.   Of note, her low episodes are as low as 45 mg/dL in the past 2 weeks.              Review of Systems:  A comprehensive 10 point review of systems was performed and was negative except as noted in the HPI above.    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  Ms. Hagen is s/p total pancreatectomy with isle autocell transplantation in 1/9/2014   Chronic pancreatitis 01/31/2014     S/P splenectomy 01/31/2014     Recurrent acute pancreatitis 04/26/2013       Surgical History    Surgical History  Surgery Date Site/Laterality Comments   CHOLECYSTECTOMY;         HX SPINAL FUSION [id number]   C4-C6    HX SALPINGO-OOPHORECTOMY     unilateral    TONSILLECTOMY & ADENOIDECTOMY; < AGE 10         TOTAL ABDOMINAL HYSTERECTOMY W/WO REMOVAL TUBE(S)/OVARY(S);         ARTHROSCOPY, KNEE, DX, W/WO SYNOVIAL BX (SEP PROC)         EXPLORATORY LAPAROTOMY, EXPLORATORY CELIOTOMY W/WO BX(S) (SEP PROC)         EGD         ------------OTHER------------- 01/09/2014   total pancreatectomy with auto islet cell transplantation     PANCREATECTOMY, TOTAL           Medical History    Medical History  Medical History Date Comments   Fibromyalgia       Hyperlipemia       Cervical disc disease       Acute pancreatitis       Pancreas divisum       Vitamin D deficiency       Osteoarthritis       Anemia       DVT (deep venous thrombosis) (CMS/HCC)   related to PICC line   Premature atrial contractions             Current medications:  Current Outpatient Medications   Medication Sig Dispense Refill     diazoxide (PROGLYCEM) 50 MG/ML suspension Take 2.5 mLs (125 mg) by mouth daily 90 mL 11     acarbose (PRECOSE) 50 MG tablet 50 mg to 100 mg up to three times daily with meals. 180 tablet 11     ALPRAZolam (XANAX) 0.25 MG tablet Take 0.25 mg by mouth nightly as needed       estradiol (VIVELLE-DOT) 0.05 MG/24HR bi-weekly patch Place 1 patch onto the skin twice a week       Glucagon (GVOKE HYPOPEN) 1 MG/0.2ML pen Inject the contents of 1 device under the skin into lower abdomen, outer thigh, or outer upper arm as needed for hypoglycemia. If no response after 15 minutes, additional 1 mg  dose from a new device may be injected while waiting for emergency assistance. 0.4 mL 0     lipase-protease-amylase (ZENPEP) 69163-17[address]-58130 units CPEP Take 125,000 Units by mouth 3 times daily (with meals)       ondansetron (ZOFRAN ODT) 4 MG ODT tab Take 4 mg by mouth every 6 hours as needed         Family History:  Family History    Family History  Medical History Relation Comments   Coronary Artery Disease Father     Heart Disease Mother     Stroke Mother     Clotting Disorder Neg Hx     Coagulopathy Neg Hx     Malignant Hyperthermia Neg Hx             Social History:  Lives in [place] with her .     Physical Exam:   Wt 66.2 kg, hg 160cm, BMI 25.8  Vitals: There were no vitals taken for this visit.  BMI= There is no height or weight on file to calculate BMI.General:  Appearance is normal, no acute distress  HEENT:  NC/AT, sclera appear white  Neck:  No obvious thyromegaly  CV/Lungs:  Non distressed breathing  Skin:  No apparent rashes  Neuro:  Normal mental status  Psych:  Normal affect    Results:  Reviewed labs from 12/29/23 in [facility].  Notable for low vit D, low vit A    VITAMIN A  Order: [id number]  Component  Ref Range & Units 1 mo ago   VITAMIN A  38 - 98 mcg/dL 28 Low    Comment: Vitamin supplementation within 24 hours prior to  blood draw may affect the accuracy of the results.  This test was developed and its analytical performance  characteristics have been determined by [employer] [place], [place]. It has  not been cleared or approved by the U.S. Food and Drug  Administration. This assay has been validated pursuant  to the CLIA regulations and is used for clinical  purposes.  Test Performed by [facility],  [facility],  [address] [place] [id number]  Steve Tan M.D., Ph.D., Director of Laboratories  [phone], CLIA [id number]  PERFORMED BY:[facility],  [address] [place] [address],  [address]  [address], [place]  [address]   Resulting Agency [facility]     Specimen Collected: 12/29/23 10:43 AM    Performed by: [facility] Last Resulted: 01/04/24  3:08 AM   Received From: [facility]  Result Received: 01/24/24 12:12 PM      ins abnormal data VITAMIN D, 25-HYDROXY  Order: [id number]  Component  Ref Range & Units 1 mo ago   VITAMIN D, 25-OH, TOTAL  30.0 - 100.0 ng/mL 13.1 Low    Comment: PERFORMED BY:[facility],  [address]  [address] [phone]   Resulting Agency [facility]     Specimen Collected: 12/29/23 10:43 AM    Performed by: [facility] Last Resulted: 12/29/23  5:24 PM   Received From: [facility]  Result Received: 01/24/24 12:12 PM         Again, thank you for allowing me to participate in the care of your patient.        Sincerely,        Phoebe Yan MD

## 2024-02-01 NOTE — PATIENT INSTRUCTIONS
1)  Start diazoxide 125 mg (2.5 mL) once daily.      2)  Once you start on the diazoxide, you can stop the acarbose.    3)  I'd suggest keeping your diet the same as we make these changes.  For your type of hypoglycemia I do think that intermittent fasting is OK (food is what tends to trigger your islets and cause glucose instability).    4)  Let's touch base in about a month to see how this new regimen is working.

## 2024-09-15 ENCOUNTER — HEALTH MAINTENANCE LETTER (OUTPATIENT)
Age: 54
End: 2024-09-15

## 2024-12-18 ENCOUNTER — LAB SERVICES (OUTPATIENT)
Age: 54
End: 2024-12-18

## 2024-12-18 ENCOUNTER — LAB REQUISITION (OUTPATIENT)
Age: 54
End: 2024-12-18

## 2024-12-18 DIAGNOSIS — R73.9 HYPERGLYCEMIA, UNSPECIFIED: ICD-10-CM

## 2024-12-18 PROBLEM — F41.9 ANXIETY: Status: ACTIVE | Noted: 2023-06-05

## 2024-12-18 PROBLEM — F32.A DEPRESSIVE DISORDER: Status: ACTIVE | Noted: 2024-12-18

## 2024-12-18 PROBLEM — M79.7 FIBROMYALGIA: Status: ACTIVE | Noted: 2024-12-18

## 2024-12-18 PROCEDURE — 36415 COLL VENOUS BLD VENIPUNCTURE: CPT | Performed by: CLINICAL MEDICAL LABORATORY

## 2024-12-18 PROCEDURE — PSEU8266 GLYCOHEMOGLOBIN: Performed by: CLINICAL MEDICAL LABORATORY

## 2024-12-18 PROCEDURE — 83036 HEMOGLOBIN GLYCOSYLATED A1C: CPT | Performed by: CLINICAL MEDICAL LABORATORY

## 2024-12-19 LAB — HBA1C MFR BLD: 5.7 % (ref 4.5–5.6)

## 2025-04-08 ENCOUNTER — EXTERNAL LAB (OUTPATIENT)
Dept: HEALTH INFORMATION MANAGEMENT | Facility: OTHER | Age: 55
End: 2025-04-08

## 2025-04-08 LAB
ALBUMIN SERPL-MCNC: ABNORMAL G/DL
ALP SERPL-CCNC: 83 IU/L (ref 44–121)
ALT SERPL-CCNC: 23 IU/L (ref 0–32)
AST SERPL-CCNC: 24 IU/L (ref 0–40)
BILIRUB SERPL-MCNC: 0.3 MG/DL (ref 0–1.2)
BUN SERPL-MCNC: 21 MG/DL (ref 6–24)
BUN/CREAT SERPL: 31 (ref 9–23)
CALCIUM SERPL-MCNC: 9 MG/DL
CHLORIDE SERPL-SCNC: 104 MMOL/L (ref 96–106)
CHOLEST SERPL-MCNC: 195 MG/DL (ref 100–199)
CO2 SERPL-SCNC: 23 MMOL/L (ref 20–29)
CREAT SERPL-MCNC: ABNORMAL MG/DL (ref 0.57–1)
GFR SERPLBLD SCHWARTZ-ARVRAT: 102 ML/MIN/1.73
GLOBULIN SER-MCNC: ABNORMAL G/DL (ref 1.5–4.5)
GLUCOSE SERPL-MCNC: 106 MG/DL (ref 70–99)
HDLC SERPL-MCNC: 58 MG/DL
LDLC SERPL CALC-MCNC: 119 MG/DL (ref 0–99)
LENGTH OF FAST TIME PATIENT: YES H
LENGTH OF FAST TIME PATIENT: YES H
POTASSIUM SERPL-SCNC: 4.4 MMOL/L (ref 3.5–5.2)
PROT SERPL-MCNC: 6.2 G/DL
SODIUM SERPL-SCNC: 139 MMOL/L (ref 134–144)
TRIGL SERPL-MCNC: 98 MG/DL (ref 0–149)
VLDLC SERPL CALC-MCNC: 18 MG/DL (ref 5–40)

## 2025-05-01 ENCOUNTER — EXTERNAL LAB (OUTPATIENT)
Dept: HEALTH INFORMATION MANAGEMENT | Facility: OTHER | Age: 55
End: 2025-05-01

## 2025-05-01 LAB — LAB RESULT: NORMAL

## 2025-05-06 RX ORDER — DIAZOXIDE 50 MG/ML
SUSPENSION ORAL
Status: ON HOLD | COMMUNITY
Start: 2024-12-19 | End: 2025-05-21 | Stop reason: CLARIF

## 2025-05-07 RX ORDER — BUPROPION HYDROCHLORIDE 100 MG/1
50 TABLET, EXTENDED RELEASE ORAL 2 TIMES DAILY
Status: ON HOLD | COMMUNITY
End: 2025-05-21

## 2025-05-07 RX ORDER — ONDANSETRON 4 MG/1
4 TABLET, FILM COATED ORAL EVERY 8 HOURS PRN
COMMUNITY

## 2025-05-07 RX ORDER — ESTRADIOL 0.25 MG/.25G
GEL TOPICAL
COMMUNITY

## 2025-05-07 ASSESSMENT — ACTIVITIES OF DAILY LIVING (ADL): SENSORY_SUPPORT_DEVICES: EYEGLASSES

## 2025-05-21 ENCOUNTER — APPOINTMENT (OUTPATIENT)
Dept: GENERAL RADIOLOGY | Age: 55
End: 2025-05-21
Attending: ORTHOPAEDIC SURGERY

## 2025-05-21 ENCOUNTER — ANESTHESIA (OUTPATIENT)
Dept: SURGERY | Age: 55
End: 2025-05-21

## 2025-05-21 ENCOUNTER — HOSPITAL ENCOUNTER (OUTPATIENT)
Age: 55
Discharge: HOME | End: 2025-05-21
Attending: ORTHOPAEDIC SURGERY | Admitting: ORTHOPAEDIC SURGERY

## 2025-05-21 ENCOUNTER — ANESTHESIA EVENT (OUTPATIENT)
Dept: SURGERY | Age: 55
End: 2025-05-21

## 2025-05-21 LAB
GLUCOSE BLDC GLUCOMTR-MCNC: 102 MG/DL (ref 70–99)
GLUCOSE BLDC GLUCOMTR-MCNC: 103 MG/DL (ref 70–99)

## 2025-05-21 PROCEDURE — C1713 ANCHOR/SCREW BN/BN,TIS/BN: HCPCS | Performed by: ORTHOPAEDIC SURGERY

## 2025-05-21 PROCEDURE — 10002801 HB RX 250 W/O HCPCS: Performed by: ANESTHESIOLOGY

## 2025-05-21 PROCEDURE — 10004452 HB PACU ADDL 30 MINUTES: Performed by: ORTHOPAEDIC SURGERY

## 2025-05-21 PROCEDURE — 13000117 HB ORTHO COMPLEX CASE EA ADD MINUTE: Performed by: ORTHOPAEDIC SURGERY

## 2025-05-21 PROCEDURE — 10006027 HB SUPPLY 278: Performed by: ORTHOPAEDIC SURGERY

## 2025-05-21 PROCEDURE — 10006023 HB SUPPLY 272: Performed by: ORTHOPAEDIC SURGERY

## 2025-05-21 PROCEDURE — 82962 GLUCOSE BLOOD TEST: CPT

## 2025-05-21 PROCEDURE — 13000001 HB PHASE II RECOVERY EA 30 MINUTES: Performed by: ORTHOPAEDIC SURGERY

## 2025-05-21 PROCEDURE — 10002800 HB RX 250 W HCPCS: Performed by: ORTHOPAEDIC SURGERY

## 2025-05-21 PROCEDURE — 13000002 HB ANESTHESIA  GENERAL   S/U + 1ST 15 MIN: Performed by: ORTHOPAEDIC SURGERY

## 2025-05-21 PROCEDURE — 13000116 HB ORTHO COMPLEX CASE S/U + 1ST 15 MIN: Performed by: ORTHOPAEDIC SURGERY

## 2025-05-21 PROCEDURE — 10002800 HB RX 250 W HCPCS: Performed by: ANESTHESIOLOGY

## 2025-05-21 PROCEDURE — 13000003 HB ANESTHESIA  GENERAL EA ADD MINUTE: Performed by: ORTHOPAEDIC SURGERY

## 2025-05-21 PROCEDURE — 10002801 HB RX 250 W/O HCPCS: Performed by: ORTHOPAEDIC SURGERY

## 2025-05-21 PROCEDURE — 10004451 HB PACU RECOVERY 1ST 30 MINUTES: Performed by: ORTHOPAEDIC SURGERY

## 2025-05-21 PROCEDURE — 10002807 HB RX 258: Performed by: ANESTHESIOLOGY

## 2025-05-21 PROCEDURE — 10002803 HB RX 637: Performed by: ANESTHESIOLOGY

## 2025-05-21 PROCEDURE — 10005281 FL INTRAOPERATIVE C ARM WITH REPORT

## 2025-05-21 DEVICE — SUTR ANCH HIP BIO-COMP P-LCK 2.9X 15.5MM
Type: IMPLANTABLE DEVICE | Site: HIP | Status: FUNCTIONAL
Brand: ARTHREX®

## 2025-05-21 RX ORDER — ROCURONIUM BROMIDE 10 MG/ML
INJECTION, SOLUTION INTRAVENOUS PRN
Status: DISCONTINUED | OUTPATIENT
Start: 2025-05-21 | End: 2025-05-21

## 2025-05-21 RX ORDER — NICOTINE POLACRILEX 4 MG
30 LOZENGE BUCCAL
Status: DISCONTINUED | OUTPATIENT
Start: 2025-05-21 | End: 2025-05-21 | Stop reason: HOSPADM

## 2025-05-21 RX ORDER — HYDRALAZINE HYDROCHLORIDE 20 MG/ML
5 INJECTION INTRAMUSCULAR; INTRAVENOUS EVERY 10 MIN PRN
Status: DISCONTINUED | OUTPATIENT
Start: 2025-05-21 | End: 2025-05-21 | Stop reason: HOSPADM

## 2025-05-21 RX ORDER — DIPHENHYDRAMINE HYDROCHLORIDE 50 MG/ML
12.5 INJECTION, SOLUTION INTRAMUSCULAR; INTRAVENOUS
Status: DISCONTINUED | OUTPATIENT
Start: 2025-05-21 | End: 2025-05-21 | Stop reason: HOSPADM

## 2025-05-21 RX ORDER — MIDAZOLAM HYDROCHLORIDE 2 MG/2ML
INJECTION, SOLUTION INTRAMUSCULAR; INTRAVENOUS
Status: DISCONTINUED
Start: 2025-05-21 | End: 2025-05-21 | Stop reason: HOSPADM

## 2025-05-21 RX ORDER — 0.9 % SODIUM CHLORIDE 0.9 %
10 VIAL (ML) INJECTION PRN
Status: DISCONTINUED | OUTPATIENT
Start: 2025-05-21 | End: 2025-05-21 | Stop reason: HOSPADM

## 2025-05-21 RX ORDER — DIPHENHYDRAMINE HYDROCHLORIDE 50 MG/ML
25 INJECTION, SOLUTION INTRAMUSCULAR; INTRAVENOUS EVERY 4 HOURS PRN
Status: DISCONTINUED | OUTPATIENT
Start: 2025-05-21 | End: 2025-05-21 | Stop reason: HOSPADM

## 2025-05-21 RX ORDER — DEXTROSE MONOHYDRATE 25 G/50ML
25 INJECTION, SOLUTION INTRAVENOUS PRN
Status: DISCONTINUED | OUTPATIENT
Start: 2025-05-21 | End: 2025-05-21 | Stop reason: HOSPADM

## 2025-05-21 RX ORDER — DROPERIDOL 2.5 MG/ML
0.62 INJECTION, SOLUTION INTRAMUSCULAR; INTRAVENOUS
Status: DISCONTINUED | OUTPATIENT
Start: 2025-05-21 | End: 2025-05-21 | Stop reason: HOSPADM

## 2025-05-21 RX ORDER — SODIUM CHLORIDE 9 MG/ML
INJECTION, SOLUTION INTRAVENOUS CONTINUOUS
Status: DISCONTINUED | OUTPATIENT
Start: 2025-05-21 | End: 2025-05-21 | Stop reason: HOSPADM

## 2025-05-21 RX ORDER — LIDOCAINE HYDROCHLORIDE 10 MG/ML
5 INJECTION, SOLUTION EPIDURAL; INFILTRATION; INTRACAUDAL; PERINEURAL PRN
Status: DISCONTINUED | OUTPATIENT
Start: 2025-05-21 | End: 2025-05-21 | Stop reason: HOSPADM

## 2025-05-21 RX ORDER — IPRATROPIUM BROMIDE AND ALBUTEROL SULFATE 2.5; .5 MG/3ML; MG/3ML
3 SOLUTION RESPIRATORY (INHALATION)
Status: DISCONTINUED | OUTPATIENT
Start: 2025-05-21 | End: 2025-05-21 | Stop reason: HOSPADM

## 2025-05-21 RX ORDER — GLYCOPYRROLATE 0.2 MG/ML
0.2 INJECTION, SOLUTION INTRAMUSCULAR; INTRAVENOUS
Status: COMPLETED | OUTPATIENT
Start: 2025-05-21 | End: 2025-05-21

## 2025-05-21 RX ORDER — MIDAZOLAM HYDROCHLORIDE 1 MG/ML
INJECTION, SOLUTION INTRAMUSCULAR; INTRAVENOUS PRN
Status: DISCONTINUED | OUTPATIENT
Start: 2025-05-21 | End: 2025-05-21

## 2025-05-21 RX ORDER — SCOPOLAMINE 1 MG/3D
1 PATCH, EXTENDED RELEASE TRANSDERMAL
Status: DISCONTINUED | OUTPATIENT
Start: 2025-05-21 | End: 2025-05-21 | Stop reason: HOSPADM

## 2025-05-21 RX ORDER — BUPIVACAINE HYDROCHLORIDE 5 MG/ML
INJECTION, SOLUTION EPIDURAL; INTRACAUDAL; PERINEURAL PRN
Status: DISCONTINUED | OUTPATIENT
Start: 2025-05-21 | End: 2025-05-21 | Stop reason: HOSPADM

## 2025-05-21 RX ORDER — 0.9 % SODIUM CHLORIDE 0.9 %
2 VIAL (ML) INJECTION EVERY 12 HOURS SCHEDULED
Status: DISCONTINUED | OUTPATIENT
Start: 2025-05-21 | End: 2025-05-21 | Stop reason: HOSPADM

## 2025-05-21 RX ORDER — DIPHENHYDRAMINE HYDROCHLORIDE 50 MG/ML
12.5 INJECTION, SOLUTION INTRAMUSCULAR; INTRAVENOUS EVERY 4 HOURS PRN
Status: DISCONTINUED | OUTPATIENT
Start: 2025-05-21 | End: 2025-05-21 | Stop reason: HOSPADM

## 2025-05-21 RX ORDER — PROPOFOL 10 MG/ML
INJECTION, EMULSION INTRAVENOUS PRN
Status: DISCONTINUED | OUTPATIENT
Start: 2025-05-21 | End: 2025-05-21

## 2025-05-21 RX ORDER — CHLORHEXIDINE GLUCONATE ORAL RINSE 1.2 MG/ML
15 SOLUTION DENTAL
Status: COMPLETED | OUTPATIENT
Start: 2025-05-21 | End: 2025-05-21

## 2025-05-21 RX ORDER — METOCLOPRAMIDE HYDROCHLORIDE 5 MG/ML
5 INJECTION INTRAMUSCULAR; INTRAVENOUS EVERY 6 HOURS PRN
Status: DISCONTINUED | OUTPATIENT
Start: 2025-05-21 | End: 2025-05-21 | Stop reason: HOSPADM

## 2025-05-21 RX ORDER — PROCHLORPERAZINE EDISYLATE 5 MG/ML
5 INJECTION INTRAMUSCULAR; INTRAVENOUS EVERY 4 HOURS PRN
Status: DISCONTINUED | OUTPATIENT
Start: 2025-05-21 | End: 2025-05-21 | Stop reason: HOSPADM

## 2025-05-21 RX ORDER — METOPROLOL SUCCINATE 25 MG/1
25 TABLET, EXTENDED RELEASE ORAL
Status: DISCONTINUED | OUTPATIENT
Start: 2025-05-21 | End: 2025-05-21 | Stop reason: HOSPADM

## 2025-05-21 RX ORDER — DEXTROSE MONOHYDRATE 50 MG/ML
INJECTION, SOLUTION INTRAVENOUS CONTINUOUS PRN
Status: DISCONTINUED | OUTPATIENT
Start: 2025-05-21 | End: 2025-05-21 | Stop reason: HOSPADM

## 2025-05-21 RX ORDER — SODIUM CHLORIDE, SODIUM LACTATE, POTASSIUM CHLORIDE, CALCIUM CHLORIDE 600; 310; 30; 20 MG/100ML; MG/100ML; MG/100ML; MG/100ML
INJECTION, SOLUTION INTRAVENOUS CONTINUOUS
Status: DISCONTINUED | OUTPATIENT
Start: 2025-05-21 | End: 2025-05-21 | Stop reason: HOSPADM

## 2025-05-21 RX ORDER — ONDANSETRON 2 MG/ML
INJECTION INTRAMUSCULAR; INTRAVENOUS PRN
Status: DISCONTINUED | OUTPATIENT
Start: 2025-05-21 | End: 2025-05-21

## 2025-05-21 RX ORDER — ONDANSETRON 2 MG/ML
4 INJECTION INTRAMUSCULAR; INTRAVENOUS 2 TIMES DAILY PRN
Status: DISCONTINUED | OUTPATIENT
Start: 2025-05-21 | End: 2025-05-21 | Stop reason: HOSPADM

## 2025-05-21 RX ORDER — MIDAZOLAM HYDROCHLORIDE 1 MG/ML
2 INJECTION, SOLUTION INTRAMUSCULAR; INTRAVENOUS
Status: COMPLETED | OUTPATIENT
Start: 2025-05-21 | End: 2025-05-21

## 2025-05-21 RX ORDER — METOCLOPRAMIDE HYDROCHLORIDE 5 MG/ML
INJECTION INTRAMUSCULAR; INTRAVENOUS PRN
Status: DISCONTINUED | OUTPATIENT
Start: 2025-05-21 | End: 2025-05-21

## 2025-05-21 RX ORDER — PROMETHAZINE HYDROCHLORIDE 25 MG/1
25 TABLET ORAL EVERY 6 HOURS PRN
Status: DISCONTINUED | OUTPATIENT
Start: 2025-05-21 | End: 2025-05-21 | Stop reason: HOSPADM

## 2025-05-21 RX ORDER — MEPERIDINE HYDROCHLORIDE 25 MG/ML
12.5 INJECTION INTRAMUSCULAR; INTRAVENOUS; SUBCUTANEOUS EVERY 10 MIN PRN
Status: DISCONTINUED | OUTPATIENT
Start: 2025-05-21 | End: 2025-05-21 | Stop reason: HOSPADM

## 2025-05-21 RX ADMIN — CHLORHEXIDINE GLUCONATE 15 ML: 1.2 RINSE ORAL at 11:01

## 2025-05-21 RX ADMIN — FENTANYL CITRATE 25 MCG: 50 INJECTION INTRAMUSCULAR; INTRAVENOUS at 12:21

## 2025-05-21 RX ADMIN — PROPOFOL 60 MCG/KG/MIN: 10 INJECTION, EMULSION INTRAVENOUS at 12:15

## 2025-05-21 RX ADMIN — KETOROLAC TROMETHAMINE 15 MG: 30 INJECTION, SOLUTION INTRAMUSCULAR at 14:00

## 2025-05-21 RX ADMIN — MIDAZOLAM HYDROCHLORIDE 2 MG: 1 INJECTION, SOLUTION INTRAMUSCULAR; INTRAVENOUS at 11:54

## 2025-05-21 RX ADMIN — GLYCOPYRROLATE 0.2 MG: 0.2 INJECTION, SOLUTION INTRAMUSCULAR; INTRAVENOUS at 11:02

## 2025-05-21 RX ADMIN — CEFAZOLIN 1000 MG: 1 INJECTION, POWDER, FOR SOLUTION INTRAMUSCULAR; INTRAVENOUS at 16:58

## 2025-05-21 RX ADMIN — SCOPOLAMINE 1 PATCH: 1.5 PATCH, EXTENDED RELEASE TRANSDERMAL at 11:01

## 2025-05-21 RX ADMIN — WATER 2000 MG: 1 INJECTION INTRAMUSCULAR; INTRAVENOUS; SUBCUTANEOUS at 12:19

## 2025-05-21 RX ADMIN — SODIUM CHLORIDE, POTASSIUM CHLORIDE, SODIUM LACTATE AND CALCIUM CHLORIDE: 600; 310; 30; 20 INJECTION, SOLUTION INTRAVENOUS at 11:58

## 2025-05-21 RX ADMIN — ONDANSETRON 4 MG: 2 INJECTION INTRAMUSCULAR; INTRAVENOUS at 14:00

## 2025-05-21 RX ADMIN — FENTANYL CITRATE 25 MCG: 50 INJECTION INTRAMUSCULAR; INTRAVENOUS at 15:59

## 2025-05-21 RX ADMIN — ROCURONIUM BROMIDE 10 MG: 10 INJECTION INTRAVENOUS at 13:04

## 2025-05-21 RX ADMIN — MIDAZOLAM HYDROCHLORIDE 1 MG: 1 INJECTION, SOLUTION INTRAMUSCULAR; INTRAVENOUS at 12:03

## 2025-05-21 RX ADMIN — ROCURONIUM BROMIDE 40 MG: 10 INJECTION INTRAVENOUS at 12:06

## 2025-05-21 RX ADMIN — METOCLOPRAMIDE HYDROCHLORIDE 5 MG: 5 INJECTION INTRAMUSCULAR; INTRAVENOUS at 14:24

## 2025-05-21 RX ADMIN — FENTANYL CITRATE 25 MCG: 50 INJECTION INTRAMUSCULAR; INTRAVENOUS at 12:48

## 2025-05-21 RX ADMIN — PROPOFOL 160 MG: 10 INJECTION, EMULSION INTRAVENOUS at 12:05

## 2025-05-21 RX ADMIN — FENTANYL CITRATE 50 MCG: 50 INJECTION INTRAMUSCULAR; INTRAVENOUS at 12:04

## 2025-05-21 RX ADMIN — FENTANYL CITRATE 25 MCG: 50 INJECTION INTRAMUSCULAR; INTRAVENOUS at 14:52

## 2025-05-21 RX ADMIN — FENTANYL CITRATE 25 MCG: 50 INJECTION INTRAMUSCULAR; INTRAVENOUS at 15:18

## 2025-05-21 RX ADMIN — SUGAMMADEX 150 MG: 100 INJECTION, SOLUTION INTRAVENOUS at 14:00

## 2025-05-21 ASSESSMENT — PAIN SCALES - GENERAL
PAINLEVEL_OUTOF10: 6
PAINLEVEL_OUTOF10: 6
PAINLEVEL_OUTOF10: 4
PAINLEVEL_OUTOF10: 5
PAINLEVEL_OUTOF10: 3
PAINLEVEL_OUTOF10: 4

## 2025-05-22 VITALS
OXYGEN SATURATION: 96 % | HEIGHT: 62 IN | SYSTOLIC BLOOD PRESSURE: 101 MMHG | TEMPERATURE: 97.5 F | WEIGHT: 151.24 LBS | DIASTOLIC BLOOD PRESSURE: 65 MMHG | RESPIRATION RATE: 16 BRPM | HEART RATE: 84 BPM | BODY MASS INDEX: 27.83 KG/M2

## 2025-08-03 ENCOUNTER — HEALTH MAINTENANCE LETTER (OUTPATIENT)
Age: 55
End: 2025-08-03

## (undated) DEVICE — SPONGE GAUZE L4 IN X W4 IN 16 PLY WOVEN FOLD EDGE CTN

## (undated) DEVICE — Device

## (undated) DEVICE — GLOVE SURG 8 PROTEXIS LF CRM PF BEAD CUFF STRL PLISPRN 12IN

## (undated) DEVICE — GLOVE SURG 6.5 PROTEXIS PI PWDR FREE BEAD CUFF PLISPRN L11.3

## (undated) DEVICE — GLOVE SURG 7 PROTEXIS PI MIC PWDR FREE SMTH BEAD CUFF

## (undated) DEVICE — PROBE ARTHRO H50 MM 50 D APOLLO RF

## (undated) DEVICE — TUBING SCT ID14 IN L20 FT RGD MALE TO MALE CNCT MDVC

## (undated) DEVICE — GOWN SURG XL L3 NONREINFORCE SET IN SLV STRL LF DISP BLUE

## (undated) DEVICE — GLOVE SURG 7.5 PROTEXIS LF CRM PF SMTH BEAD CUFF STRL

## (undated) DEVICE — PAD ABD L9 IN X W5 IN ABS NONWOVEN HDRPHB BACK SEAL EDGE

## (undated) DEVICE — SUTURE ETHILON MTPS 3-0 PS1 18IN MONO NABSB BLK 1663H

## (undated) DEVICE — SOLUTION SURG PREP 26 ML DURAPREP 74% ISOPROPYL ALCOHOL 0.7%

## (undated) DEVICE — NEEDLE HPO L1 12 IN OD16 GA REG WALL BD PP REG BVL LL HUB

## (undated) DEVICE — DRAPE .75 SHT FNFLD 76X52IN SURG CNVRT STRL LF DISP TIBURON

## (undated) DEVICE — PASSER SUT XL CRSNT ACCU-PASS DRCT HIP ARTHROSCOPY

## (undated) DEVICE — DRESSING PETRO 9X5IN NADH OCL IMPREGNATE CRD XEROFORM CTN

## (undated) DEVICE — DRAPE SURG ADH L51 IN X W47 IN STERI-DRAPE CLR

## (undated) DEVICE — SYRINGE 50 ML GRAD NONPYROGENIC DEHP FREE PVC FREE LOK MED

## (undated) DEVICE — COVER EQUIPMENT UNBRANDED C ARM

## (undated) DEVICE — GOWN SURG LG FABRIC ASTOUND BLUE LVL 3 NONREINFORCE SET IN

## (undated) DEVICE — GLOVE SURG 7 PROTEXIS PI LF CRM PF BEAD CUFF STRL PLISPRN

## (undated) DEVICE — CLAMP 5.5IN DEVON DO-CLMP DRAPE SURG PLASTIC STRL DISP 761

## (undated) DEVICE — SLEEVE SURG L21.5 IN ELASTIC END NONWOVEN OD3-5.5 IN CNVRT

## (undated) DEVICE — SUTURE SUTURETAPE BLK BLUE WHT L40 IN X 2 STRN OD1.3 MM

## (undated) DEVICE — GLOVE SURG 7 PROTEXIS PI CLASSIC PWDR FREE BEAD CUFF PLISPRN

## (undated) DEVICE — KIT ENDO INST

## (undated) DEVICE — PUMP TBG ARTHRO L13 FT CONTINUOUS WAVE 3 DUALWAVE

## (undated) DEVICE — DRAPE SURG 2 INCISE FILM POUCH ISOLATION L129 IN X W100 IN

## (undated) DEVICE — GLOVE SURG 7 PROTEXIS LF BLUE PF SMTH BEAD CUFF INTLK STRL

## (undated) DEVICE — BLADE SHAVER OD4.2 MM CRV HIP LGTH CRV HIP LGTH L19 CM

## (undated) DEVICE — KIT ROOM TURNOVER INFECTION CNTRL GOOD SHEPHERD

## (undated) DEVICE — SUTURE SUTURETAPE BLK WHT 2 12 CRC L36.6 MM W1.3 MM TPR NDL

## (undated) DEVICE — SUTURE MONOCRYL 3-0 PS-2 L18 IN MONO UNDYED ABS

## (undated) DEVICE — BLANKET WARMING L74 IN X W24 IN BAIR HGGR PLMR ADULT UPR